# Patient Record
Sex: FEMALE | Race: WHITE | NOT HISPANIC OR LATINO | Employment: FULL TIME | URBAN - NONMETROPOLITAN AREA
[De-identification: names, ages, dates, MRNs, and addresses within clinical notes are randomized per-mention and may not be internally consistent; named-entity substitution may affect disease eponyms.]

---

## 2024-11-22 ENCOUNTER — OFFICE VISIT (OUTPATIENT)
Dept: URGENT CARE | Facility: CLINIC | Age: 54
End: 2024-11-22
Payer: COMMERCIAL

## 2024-11-22 VITALS
DIASTOLIC BLOOD PRESSURE: 84 MMHG | HEIGHT: 63 IN | SYSTOLIC BLOOD PRESSURE: 144 MMHG | RESPIRATION RATE: 16 BRPM | OXYGEN SATURATION: 95 % | TEMPERATURE: 97.1 F | BODY MASS INDEX: 51.91 KG/M2 | WEIGHT: 293 LBS | HEART RATE: 94 BPM

## 2024-11-22 DIAGNOSIS — R30.0 DYSURIA: Primary | ICD-10-CM

## 2024-11-22 LAB
POC APPEARANCE, URINE: CLEAR
POC BILIRUBIN, URINE: NEGATIVE
POC BLOOD, URINE: ABNORMAL
POC COLOR, URINE: YELLOW
POC GLUCOSE, URINE: NEGATIVE MG/DL
POC KETONES, URINE: NEGATIVE MG/DL
POC LEUKOCYTES, URINE: ABNORMAL
POC NITRITE,URINE: NEGATIVE
POC PH, URINE: 6 PH
POC PROTEIN, URINE: ABNORMAL MG/DL
POC SPECIFIC GRAVITY, URINE: 1.02
POC UROBILINOGEN, URINE: 0.2 EU/DL

## 2024-11-22 PROCEDURE — 99213 OFFICE O/P EST LOW 20 MIN: CPT | Performed by: NURSE PRACTITIONER

## 2024-11-22 PROCEDURE — 81003 URINALYSIS AUTO W/O SCOPE: CPT | Performed by: NURSE PRACTITIONER

## 2024-11-22 PROCEDURE — 87086 URINE CULTURE/COLONY COUNT: CPT

## 2024-11-22 RX ORDER — METFORMIN HYDROCHLORIDE 1000 MG/1
1000 TABLET ORAL 2 TIMES DAILY
COMMUNITY

## 2024-11-22 RX ORDER — CIPROFLOXACIN 500 MG/1
500 TABLET ORAL 2 TIMES DAILY
Qty: 14 TABLET | Refills: 0 | Status: SHIPPED | OUTPATIENT
Start: 2024-11-22 | End: 2024-11-29

## 2024-11-22 RX ORDER — LISINOPRIL 20 MG/1
20 TABLET ORAL DAILY
COMMUNITY

## 2024-11-22 RX ORDER — MONTELUKAST SODIUM 10 MG/1
10 TABLET ORAL DAILY
COMMUNITY

## 2024-11-22 RX ORDER — CHOLECALCIFEROL (VITD3)/VIT K2 137.5-2
1 TABLET ORAL
COMMUNITY
Start: 2024-10-02

## 2024-11-22 NOTE — PROGRESS NOTES
"[facility] URGENT CARE  Karla Church, APRN-CNP     Visit Note - 11/22/2024 5:40 PM   This note was generated with voice recognition software and may contain errors including spelling, grammar, syntax, and misrecognization of what was dictated.    Patient: Evie Link, MRN: [id number], 54 y.o., female   PCP: No primary care provider on file.  ------------------------------------  ALLERGIES:   Allergies   Allergen Reactions    Erythromycin Hives and Rash     Other Reaction(s): Rash    Penicillin Hives and Rash    Sulfamethoxazole-Trimethoprim Rash     Other Reaction(s): Rash        CURRENT MEDICATIONS:   Current Outpatient Medications   Medication Instructions    ciprofloxacin (CIPRO) 500 mg, oral, 2 times daily    DosoKap 137.5-200 mcg tablet 1 tablet, Daily (0630)    lisinopril 20 mg, oral, Daily    metFORMIN (GLUCOPHAGE) 1,000 mg, oral, 2 times daily    montelukast (SINGULAIR) 10 mg, oral, Daily     ------------------------------------  PAST MEDICAL HX:  History of Type 2 DM; also has allergies.      SURGICAL HX:  Past Surgical History:   Procedure Laterality Date    APPENDECTOMY        FAMILY HX:   No pertinent history.   SOCIAL HX:    reports that she has quit smoking. Her smoking use included cigarettes. She has never used smokeless tobacco. Employed. Is not sexually active.   ------------------------------------  CHIEF COMPLAINT:   Chief Complaint   Patient presents with    painful urination     Urgency and frequency x over 1 week      HISTORY OF PRESENT ILLNESS: The history was obtained from patient. Evie is a 54 y.o. female, who presents with a chief complaint of dysuria that started on Saturday or Sunday. Has had discomfort with urination (especially at the end of stream), urinary frequency, urinary urgency, pressure in lower abdomen, and mild L sided back-ache. She denies any fever/chills, blood in urine, malaise, lethargy, nausea/vomiting, bloating, abdominal distension/\"pain,\" change " "in bowel habits, and vaginal symptoms. Denies any changes in mental status. Has been taking Ibuprofen and drinking cranberry juice without much relief. Has not tried any other OTC medications or conservative measures for her symptoms. Has IUD; has not been sexually active in several years. Is diabetic - reports does not check her glucose regularly.     REVIEW OF SYSTEMS:  10 systems reviewed negative with exception of history of present illness as listed above.    TODAY'S VITALS: /84 (BP Location: Left arm, Patient Position: Sitting)   Pulse 94   Temp 36.2 °C (97.1 °F) (Temporal)   Resp 16   Ht 1.6 m (5' 3\")   Wt 136 kg (300 lb)   SpO2 95%   BMI 53.14 kg/m²     PHYSICAL EXAMINATION:  General: Pleasant, well-nourished female; alert and oriented, in no acute distress. Sitting comfortably on exam table.    Eyes:  Pupils equal, round and reactive to light. Non-icteric; conjunctiva clear.   HENT: Normocephalic. Oral mucosa moist.   Neck:  Supple. No lymphadenopathy.  Respiratory:  Lungs are clear to auscultation; no wheezes, rhonchi, or rales. Respirations unlabored, Breath sounds are equal, Symmetrical chest wall expansion.  Cardiovascular:  Regular rate, Regular rhythm. Normal S1S2. No m/r/g.  Gastrointestinal:  Soft, non-tender, non-distended; no palpable masses or organomegaly. Bowel sounds normoactive. Has mild, L sided CVA tenderness.  Musculoskeletal:  Grossly normal; appropriate for age.     Integumentary:  Pink, warm, dry, and intact. No rashes or skin discoloration appreciated. Good skin turgor.  Neurologic:  Alert and oriented; grossly intact.    Cognition and Speech:  Oriented, Speech clear and coherent.    Psychiatric:  Cooperative, Appropriate mood & affect.       ------------------------------------  Medical Decision Making  LABORATORY or RADIOLOGICAL IMAGING ORDERS/RESULTS:   Recent Results (from the past 24 hours)   POCT UA Automated manually resulted    Collection Time: 11/22/24  5:51 PM "   Result Value Ref Range    POC Color, Urine Yellow Straw, Yellow, Light-Yellow    POC Appearance, Urine Clear Clear    POC Glucose, Urine NEGATIVE NEGATIVE mg/dl    POC Bilirubin, Urine NEGATIVE NEGATIVE    POC Ketones, Urine NEGATIVE NEGATIVE mg/dl    POC Specific Gravity, Urine 1.025 1.005 - 1.035    POC Blood, Urine SMALL (1+) (A) NEGATIVE    POC PH, Urine 6.0 No Reference Range Established PH    POC Protein, Urine 30 (1+) NEGATIVE, 30 (1+) mg/dl    POC Urobilinogen, Urine 0.2 0.2, 1.0 EU/DL    Poc Nitrite, Urine NEGATIVE NEGATIVE    POC Leukocytes, Urine LARGE (3+) (A) NEGATIVE       IMPRESSION/PLAN:  Course: Worsening; stable    1. Dysuria (Primary)  - POCT UA Automated manually resulted  - Urine Culture  - ciprofloxacin (Cipro) 500 mg tablet; Take 1 tablet (500 mg) by mouth 2 times a day for 7 days.  Dispense: 14 tablet; Refill: 0    Symptoms/exam consistent with UTI with concerns for early pyelonephritis, although reviewed other potential etiologies. Has mild L sided CVA tenderness, but is afebrile, and no other red flags on exam. Patient will be discharged home with oral antibiotics (Cipro). Instructed to begin antibiotic ASAP (reviewed expectations and common side effects of treatment), and complete full course of medication, even if symptoms resolve more quickly. Also advised to push fluids, rest, and to use appropriate over the counter medications for management of symptoms - cranberry juice/pills may be helpful. Advised to follow-up with primary care provider or ER ASAP if develops fever/chills, body aches, malaise, increased back/abdominal pain, nausea/vomiting, mental status changes, or if additional concerns/red flags develop. Should also follow up with PCP re: proteinuria noted today. Patient agreed with plan of care; questions were encouraged and answered.    Sending urine for culture to ensure appropriate antibiotic coverage - will contact with results/recommendations.       Karla Church,  APRN-CNP   Advanced Practice Provider  [facility] URGENT CARE

## 2024-11-24 LAB — BACTERIA UR CULT: NORMAL

## 2024-11-25 NOTE — RESULT ENCOUNTER NOTE
Reviewed urine culture result with patient - no significant growth. She reports discomfort with urination is persisting without much change - advised should follow up with PCP ASAP, or seek care at the ER if symptoms worse or if any red flags develop. Patient stated understanding and agreed.

## 2025-04-04 NOTE — TELEPHONE ENCOUNTER
Pharmacy is requesting medication refill. Please approve or deny this request.    Rx requested:  Requested Prescriptions     Pending Prescriptions Disp Refills    lisinopril (PRINIVIL;ZESTRIL) 20 MG tablet [Pharmacy Med Name: LISINOPRIL TABS 20MG]  0    montelukast (SINGULAIR) 10 MG tablet [Pharmacy Med Name: MONTELUKAST SODIUM TABS 10MG]  0         Last Office Visit:   Visit date not found      Next Visit Date:  No future appointments.

## 2025-04-07 RX ORDER — LISINOPRIL 20 MG/1
TABLET ORAL
Refills: 0 | OUTPATIENT
Start: 2025-04-07

## 2025-04-07 RX ORDER — MONTELUKAST SODIUM 10 MG/1
TABLET ORAL
Refills: 0 | OUTPATIENT
Start: 2025-04-07

## 2025-06-29 ENCOUNTER — HOSPITAL ENCOUNTER (EMERGENCY)
Age: 55
Discharge: HOME | End: 2025-06-29
Attending: EMERGENCY MEDICINE

## 2025-06-29 VITALS
HEART RATE: 79 BPM | TEMPERATURE: 98 F | OXYGEN SATURATION: 95 % | BODY MASS INDEX: 51.91 KG/M2 | DIASTOLIC BLOOD PRESSURE: 55 MMHG | RESPIRATION RATE: 16 BRPM | HEIGHT: 63 IN | SYSTOLIC BLOOD PRESSURE: 124 MMHG | WEIGHT: 293 LBS

## 2025-06-29 DIAGNOSIS — K52.9 GASTROENTERITIS: Primary | ICD-10-CM

## 2025-06-29 DIAGNOSIS — R19.7 NAUSEA VOMITING AND DIARRHEA: ICD-10-CM

## 2025-06-29 DIAGNOSIS — R11.2 NAUSEA VOMITING AND DIARRHEA: ICD-10-CM

## 2025-06-29 LAB
ALBUMIN SERPL-MCNC: 3.4 G/DL (ref 3.5–5.2)
ALBUMIN/GLOB SERPL: 1 {RATIO} (ref 1–2.5)
ALP SERPL-CCNC: 64 U/L (ref 35–104)
ALT SERPL-CCNC: 5 U/L (ref 10–35)
ANION GAP SERPL CALCULATED.3IONS-SCNC: 15 MMOL/L (ref 9–16)
AST SERPL-CCNC: 20 U/L (ref 10–35)
BASOPHILS # BLD: 0.06 K/UL (ref 0–0.2)
BASOPHILS NFR BLD: 0 % (ref 0–2)
BILIRUB SERPL-MCNC: 0.3 MG/DL (ref 0–1.2)
BUN SERPL-MCNC: 23 MG/DL (ref 6–20)
CALCIUM SERPL-MCNC: 9.3 MG/DL (ref 8.6–10.4)
CHLORIDE SERPL-SCNC: 108 MMOL/L (ref 98–107)
CO2 SERPL-SCNC: 16 MMOL/L (ref 20–31)
CREAT SERPL-MCNC: 1.3 MG/DL (ref 0.6–0.9)
EOSINOPHIL # BLD: 0.22 K/UL (ref 0–0.44)
EOSINOPHILS RELATIVE PERCENT: 1 % (ref 1–4)
ERYTHROCYTE [DISTWIDTH] IN BLOOD BY AUTOMATED COUNT: 14.6 % (ref 11.8–14.4)
GFR, ESTIMATED: 49 ML/MIN/1.73M2
GLUCOSE SERPL-MCNC: 201 MG/DL (ref 74–99)
HCT VFR BLD AUTO: 45.1 % (ref 36.3–47.1)
HGB BLD-MCNC: 13.8 G/DL (ref 11.9–15.1)
IMM GRANULOCYTES # BLD AUTO: 0.19 K/UL (ref 0–0.3)
IMM GRANULOCYTES NFR BLD: 1 %
LIPASE SERPL-CCNC: 60 U/L (ref 13–60)
LYMPHOCYTES NFR BLD: 4.38 K/UL (ref 1.1–3.7)
LYMPHOCYTES RELATIVE PERCENT: 19 % (ref 24–43)
MCH RBC QN AUTO: 25.8 PG (ref 25.2–33.5)
MCHC RBC AUTO-ENTMCNC: 30.6 G/DL (ref 28.4–34.8)
MCV RBC AUTO: 84.3 FL (ref 82.6–102.9)
MONOCYTES NFR BLD: 1.05 K/UL (ref 0.1–1.2)
MONOCYTES NFR BLD: 5 % (ref 3–12)
NEUTROPHILS NFR BLD: 74 % (ref 36–65)
NEUTS SEG NFR BLD: 16.93 K/UL (ref 1.5–8.1)
NRBC BLD-RTO: 0 PER 100 WBC
PLATELET # BLD AUTO: 601 K/UL (ref 138–453)
PMV BLD AUTO: 10 FL (ref 8.1–13.5)
POTASSIUM SERPL-SCNC: 4.4 MMOL/L (ref 3.7–5.3)
PROT SERPL-MCNC: 6.8 G/DL (ref 6.6–8.7)
RBC # BLD AUTO: 5.35 M/UL (ref 3.95–5.11)
RBC # BLD: ABNORMAL 10*6/UL
SODIUM SERPL-SCNC: 139 MMOL/L (ref 136–145)
WBC OTHER # BLD: 22.8 K/UL (ref 3.5–11.3)

## 2025-06-29 PROCEDURE — 2580000003 HC RX 258

## 2025-06-29 PROCEDURE — 96361 HYDRATE IV INFUSION ADD-ON: CPT

## 2025-06-29 PROCEDURE — 93005 ELECTROCARDIOGRAM TRACING: CPT | Performed by: EMERGENCY MEDICINE

## 2025-06-29 PROCEDURE — 80053 COMPREHEN METABOLIC PANEL: CPT

## 2025-06-29 PROCEDURE — 85025 COMPLETE CBC W/AUTO DIFF WBC: CPT

## 2025-06-29 PROCEDURE — 83690 ASSAY OF LIPASE: CPT

## 2025-06-29 PROCEDURE — 96374 THER/PROPH/DIAG INJ IV PUSH: CPT

## 2025-06-29 PROCEDURE — 6360000002 HC RX W HCPCS

## 2025-06-29 PROCEDURE — 99284 EMERGENCY DEPT VISIT MOD MDM: CPT

## 2025-06-29 RX ORDER — ONDANSETRON 4 MG/1
4 TABLET, FILM COATED ORAL EVERY 8 HOURS PRN
Qty: 15 TABLET | Refills: 0 | Status: CANCELLED | OUTPATIENT
Start: 2025-06-29

## 2025-06-29 RX ORDER — LISINOPRIL 20 MG/1
20 TABLET ORAL DAILY
COMMUNITY

## 2025-06-29 RX ORDER — HYDROCHLOROTHIAZIDE 25 MG/1
25 TABLET ORAL DAILY
COMMUNITY

## 2025-06-29 RX ORDER — 0.9 % SODIUM CHLORIDE 0.9 %
1000 INTRAVENOUS SOLUTION INTRAVENOUS ONCE
Status: COMPLETED | OUTPATIENT
Start: 2025-06-29 | End: 2025-06-29

## 2025-06-29 RX ORDER — CHOLECALCIFEROL (VITD3)/VIT K2 137.5-2
1 TABLET ORAL
COMMUNITY
Start: 2024-10-02

## 2025-06-29 RX ORDER — ONDANSETRON 4 MG/1
4 TABLET, FILM COATED ORAL EVERY 8 HOURS PRN
Qty: 5 TABLET | Refills: 0 | Status: SHIPPED | OUTPATIENT
Start: 2025-06-29 | End: 2025-07-01

## 2025-06-29 RX ORDER — ONDANSETRON 2 MG/ML
4 INJECTION INTRAMUSCULAR; INTRAVENOUS ONCE
Status: COMPLETED | OUTPATIENT
Start: 2025-06-29 | End: 2025-06-29

## 2025-06-29 RX ORDER — ONDANSETRON 4 MG/1
4 TABLET, FILM COATED ORAL EVERY 8 HOURS PRN
Qty: 15 TABLET | Refills: 0 | Status: SHIPPED | OUTPATIENT
Start: 2025-06-29 | End: 2025-06-29

## 2025-06-29 RX ORDER — MONTELUKAST SODIUM 10 MG/1
10 TABLET ORAL DAILY
COMMUNITY

## 2025-06-29 RX ADMIN — SODIUM CHLORIDE 1000 ML: 9 INJECTION, SOLUTION INTRAVENOUS at 17:40

## 2025-06-29 RX ADMIN — ONDANSETRON 4 MG: 2 INJECTION INTRAMUSCULAR; INTRAVENOUS at 17:40

## 2025-06-29 ASSESSMENT — PAIN DESCRIPTION - ORIENTATION: ORIENTATION: MID

## 2025-06-29 ASSESSMENT — PAIN DESCRIPTION - LOCATION: LOCATION: ABDOMEN

## 2025-06-29 ASSESSMENT — PAIN SCALES - GENERAL: PAINLEVEL_OUTOF10: 8

## 2025-06-29 ASSESSMENT — PAIN DESCRIPTION - DESCRIPTORS: DESCRIPTORS: SHARP

## 2025-06-29 NOTE — ED PROVIDER NOTES
[facility] EMERGENCY DEPARTMENT  Emergency Department Encounter  Emergency Medicine Resident     Pt Name:Judith Jimenez  MRN: [id number]  Birthdate 1970  Date of evaluation: 6/29/25  PCP:  Luis Lazo MD  Note Started: 5:11 PM EDT      CHIEF COMPLAINT       Chief Complaint   Patient presents with    Abdominal Pain    Diarrhea    Vomiting       HISTORY OF PRESENT ILLNESS  (Location/Symptom, Timing/Onset, Context/Setting, Quality, Duration, Modifying Factors, Severity.)      Judith Jimenez is a 55 y.o. female who presents with nausea, vomiting, diarrhea, abdominal pain after eating at a [place] [employer] restaurant.  He reports she ate shrimp and mushrooms, her mother at bedside states she did not eat those foods and does not have any symptoms.  States she has had multiple episodes of vomiting and diarrhea since eating at restaurant.  Denies chest pain, shortness of breath, fever, chills, blood in stool or vomit, dysuria, lightheadedness, dizziness, headache.     PAST MEDICAL / SURGICAL / SOCIAL / FAMILY HISTORY      has no past medical history on file.       has no past surgical history on file.      Social History     Socioeconomic History    Marital status:      Spouse name: Not on file    Number of children: Not on file    Years of education: Not on file    Highest education level: Not on file   Occupational History    Not on file   Tobacco Use    Smoking status: Not on file    Smokeless tobacco: Not on file   Substance and Sexual Activity    Alcohol use: Not on file    Drug use: Not on file    Sexual activity: Not on file   Other Topics Concern    Not on file   Social History Narrative    Not on file     Social Drivers of Health     Financial Resource Strain: Not on file   Food Insecurity: Not on file   Transportation Needs: Not on file   Physical Activity: Not on file   Stress: Not on file   Social Connections: Not on file   Intimate Partner Violence: Not on file   Housing

## 2025-06-29 NOTE — ED PROVIDER NOTES
[facility] EMERGENCY DEPARTMENT     Emergency Department     Faculty Attestation    I performed a history and physical examination of the patient and discussed management with the resident. I reviewed the resident’s note and agree with the documented findings and plan of care. Any areas of disagreement are noted on the chart. I was personally present for the key portions of any procedures. I have documented in the chart those procedures where I was not present during the key portions. I have reviewed the emergency nurses triage note. I agree with the chief complaint, past medical history, past surgical history, allergies, medications, social and family history as documented unless otherwise noted below. For Physician Assistant/ Nurse Practitioner cases/documentation I have personally evaluated this patient and have completed at least one if not all key elements of the E/M (history, physical exam, and MDM). Additional findings are as noted.    Note Started: 5:12 PM EDT    Patient here with sudden onset abdominal pain vomiting diarrhea.  Patient states she had lunch at the [employer] shrimp and mushrooms.  States soon after began having symptoms.  Her mother who accompanies her provides independent history did not eat the same thing.  Patient states she had been well prior to that no symptoms no other travel.  On exam patient peers uncomfortable but nontoxic.  Abdomen soft mild diffuse tenderness but no rebound no guarding.  Will check labs fluids meds reevaluate need for additional workup    EKG interpretation: Sinus rhythm 86 normal intervals normal axis no acute ST or T changes.  No acute findings      Critical Care     none    Yung Alcantara MD, FACEP  Attending Emergency  Physician           Yung Alcantara MD  06/29/25 [id number]

## 2025-06-29 NOTE — ED NOTES
Pt presents to ED via wheelchair through triage c/o abdominal pain, emesis and diarrhea. Pt reports two episodes of both emesis and diarrhea. Pt is not throwing up upon triage, but reports nausea. Pt reports concerns of food poisoning. Pt reports symptoms began after eating korean barbeque. Pt is afebrile and Aox4. Pt is ambulatory. Pt denies chest pain or shortness of breath.  Pt placed on full cardiac monitor, IV access established. Labs drawn. Call light within reach.  Pt changed into gown.

## 2025-06-30 NOTE — DISCHARGE INSTRUCTIONS
You were evaluated in the emergency department for nausea, vomiting, diarrhea.  Your symptoms are likely secondary to gastroenteritis after you ate something that was spoiled at the restaurant.  Make sure you are staying well-hydrated with lots of fluid such as Gatorade, Pedialyte, water, juice.  You were also prescribed a medication called Zofran, take 1 tablet every 8 hours as needed for nausea and vomiting.  Schedule an appointment with your family doctor within the next 24 hours for reevaluation of your symptoms and for repeat labs to make sure your kidney function is normal.    Return to the emergency department if you experience worsening symptoms, severe abdominal pain or vomiting, blood in your vomit or stool, high fever that is not going down with Tylenol or Motrin, chest pain, shortness of breath, lightheadedness, dizziness, changes in vision, weakness, numbness and tingling in your arms or legs, slurred speech, or if you have any new concerns.

## 2025-07-02 LAB
EKG ATRIAL RATE: 86 BPM
EKG P AXIS: 45 DEGREES
EKG P-R INTERVAL: 132 MS
EKG Q-T INTERVAL: 384 MS
EKG QRS DURATION: 86 MS
EKG QTC CALCULATION (BAZETT): 459 MS
EKG R AXIS: 7 DEGREES
EKG T AXIS: 53 DEGREES
EKG VENTRICULAR RATE: 86 BPM

## 2025-07-02 PROCEDURE — 93010 ELECTROCARDIOGRAM REPORT: CPT | Performed by: INTERNAL MEDICINE
